# Patient Record
Sex: MALE | Race: BLACK OR AFRICAN AMERICAN
[De-identification: names, ages, dates, MRNs, and addresses within clinical notes are randomized per-mention and may not be internally consistent; named-entity substitution may affect disease eponyms.]

---

## 2019-09-15 ENCOUNTER — HOSPITAL ENCOUNTER (EMERGENCY)
Dept: HOSPITAL 72 - EMR | Age: 29
Discharge: HOME | End: 2019-09-15
Payer: SELF-PAY

## 2019-09-15 VITALS — BODY MASS INDEX: 25.92 KG/M2 | HEIGHT: 69 IN | WEIGHT: 175 LBS

## 2019-09-15 VITALS — DIASTOLIC BLOOD PRESSURE: 1 MMHG | SYSTOLIC BLOOD PRESSURE: 147 MMHG
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
## 2019-09-15 DIAGNOSIS — S89.92XA: Primary | ICD-10-CM

## 2019-09-15 PROCEDURE — 99283 EMERGENCY DEPT VISIT LOW MDM: CPT

## 2019-09-15 PROCEDURE — 29505 APPLICATION LONG LEG SPLINT: CPT

## 2019-09-15 NOTE — NUR
ED Nurse Note:

Pt ambulated to ED from a basketball game, pt reports hearing a pop in his L 
knee while landing on it. reports pain when moving in certain ways. VSS

## 2019-09-16 NOTE — DIAGNOSTIC IMAGING REPORT
INDICATION: Knee Pain

 

COMPARISON: None

 

 3 views of the left knee were obtained. 

 

FINDINGS:  There is joint space narrowing with marginal osteophyte formation and

subchondral sclerosis. No joint effusion seen. No fracture or malalignment

identified.

 

IMPRESSION:

 

Mild osteoarthritis of the knee

## 2019-09-16 NOTE — EMERGENCY ROOM REPORT
History of Present Illness


General


Chief Complaint:  Lower Extremity Injury


Source:  Patient





Present Illness


HPI


28-year-old male presents ED for evaluation.  Planing of left knee pain.  

States that while playing basketball tonight he landed and felt a "pop" in his 

left knee.  States pain is dull, 6 out of 10, nonradiating.  Worse with 

walking.  Denies any other injuries.  Is able to bear weight but with some 

difficulty.  No other aggravating relieving factors.  Denies any other 

associated symptoms


Allergies:  


Coded Allergies:  


     No Known Allergies (Unverified , 3/26/13)





Patient History


Past Medical History:  none


Past Surgical History:  none


Pertinent Family History:  none


Social History:  Denies: smoking, alcohol use, drug use


Immunizations:  UTD


Reviewed Nursing Documentation:  PMH: Agreed; PSxH: Agreed





Nursing Documentation-PMH


Past Medical History:  No Stated History





Review of Systems


All Other Systems:  negative except mentioned in HPI





Physical Exam





Vital Signs








  Date Time  Temp Pulse Resp B/P (MAP) Pulse Ox O2 Delivery O2 Flow Rate FiO2


 


9/15/19 21:55 98.2 83 18 147/1 (49) 98 Room Air  








Sp02 EP Interpretation:  reviewed, normal


General Appearance:  no apparent distress, alert, GCS 15, non-toxic


Head:  normocephalic


Eyes:  bilateral eye normal inspection, bilateral eye PERRL


ENT:  normal ENT inspection


Neck:  normal inspection


Respiratory:  normal inspection


Cardiovascular #1:  normal inspection


Gastrointestinal:  normal inspection


Rectal:  deferred


Genitourinary:  no CVA tenderness


Musculoskeletal:  normal range of motion, swelling - L knee


Neurologic:  alert, oriented x3, responsive, motor strength/tone normal, 

sensory intact, speech normal


Psychiatric:  normal inspection


Skin:  no rash


Lymphatic:  normal inspection





Procedures


Splinting


Splinting :  


   Consent:  Verbal


   Pre-Made Type:  knee immobilizer


   Pre-Proc Neuro Vasc Exam:  normal


   Post-Proc Neuro Vasc Exam:  normal


   Patient Tolerated:  Well


   Complications:  None





Medical Decision Making


Diagnostic Impression:  


 Primary Impression:  


 Knee injury


 Qualified Codes:  S89.92XA - Unspecified injury of left lower leg, initial 

encounter


ER Course


Hospital Course 


28-year-old M presents to ED complaining of L knee pain 





Differential diagnoses include: Fracture, dislocation, sprain, contusion





Clinical course


Patient placed on stretcher.  After initial history and physical, I ordered 

xrays of L knee. patient declined pain meds 





Xrays prelim read shows no acute fracture/dislocation.  placed in knee 

immobilizer.  





I discussed findings with patient.  Pain likely ligamentous.  Patient will 

benefit from conservative treatment of ice, elevation, nonweightbearing for 7 

to 10 days.  Reassessment.  If pain is to persist recommend orthopedic 

evaluation then.  I will provide referrals





Diagnosis - knee injury





Stable and discharged to home with prescription for Motrin.  apply ice, keep 

elevated.  weight bear as tolerated.  Followup with PMD/ortho.  Return to ED if 

symptoms recur or worsen


Other X-Ray Diagnostic Results


Other X-Ray Diagnostic Results :  


   X-Ray ordered:  L knee


   # of Views/Limited Vs Complete:  3 View


   Indication:  Pain


   EP Interpretation:  Yes


   Interpretation:  no dislocation, no soft tissue swelling, no fractures


   Impression:  No acute disease


   Electronically Signed by:  Electronically signed by Jhonatan Nance MD





Last Vital Signs








  Date Time  Temp Pulse Resp B/P (MAP) Pulse Ox O2 Delivery O2 Flow Rate FiO2


 


9/15/19 23:05 98.2  18 147/1 98 Room Air  


 


9/15/19 21:55  83      








Status:  improved


Disposition:  HOME, SELF-CARE


Condition:  Stable


Scripts


Ibuprofen* (MOTRIN*) 600 Mg Tablet


600 MG ORAL Q8H PRN for For Pain, #30 TAB 0 Refills


   Prov: Jhonatan Nance MD         9/15/19


Referrals:  


NON PHYSICIAN (PCP)











[facility]





[facility]  **Open 24 hour /7 days a week by Appointment Only**





[address] [facility] [address]


[facility] [id number]


Departure Forms:  Return to Work      Return to Work Date:  Sep 19, 2019


   Work Restrictions:  No Prolonged Standing


Patient Instructions:  Knee Pain, Easy-to-Read











Jhonatan Nance MD Sep 16, 2019 00:53